# Patient Record
Sex: MALE | NOT HISPANIC OR LATINO | URBAN - METROPOLITAN AREA
[De-identification: names, ages, dates, MRNs, and addresses within clinical notes are randomized per-mention and may not be internally consistent; named-entity substitution may affect disease eponyms.]

---
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
## 2020-11-30 ENCOUNTER — IMPORTED ENCOUNTER (OUTPATIENT)
Dept: URBAN - METROPOLITAN AREA CLINIC 1 | Facility: CLINIC | Age: 20
End: 2020-11-30

## 2020-11-30 PROBLEM — H52.13: Noted: 2020-11-30

## 2020-11-30 PROBLEM — H52.222: Noted: 2020-11-30

## 2020-11-30 PROCEDURE — S0621 ROUTINE OPHTHALMOLOGICAL EXA: HCPCS

## 2020-11-30 NOTE — PATIENT DISCUSSION
1. Myopia w/ Astigmatism OS -- Rx was given for correction if indicated and requested. Return for an appointment in 1 year 36 with Dr. Nona No.

## 2021-10-18 ENCOUNTER — IMPORTED ENCOUNTER (OUTPATIENT)
Dept: URBAN - METROPOLITAN AREA CLINIC 1 | Facility: CLINIC | Age: 21
End: 2021-10-18

## 2021-10-18 PROBLEM — H10.45: Noted: 2021-10-18

## 2021-10-18 PROBLEM — H04.123: Noted: 2021-10-18

## 2021-10-18 PROCEDURE — 99214 OFFICE O/P EST MOD 30 MIN: CPT

## 2021-10-18 NOTE — PATIENT DISCUSSION
1.  Dry Eyes OU -- Recommend ATs BID OU routinely (Sample given of Refresh). 2. Allergic Conjunctivitis OU -- Recommend OTC Pataday QD OU PRN for itching (Handout given). The condition was  discussed with the patient. Avoidance of allergens and cool compresses were recommended. Patient deferred Manifest Rx today. (seen under vision plan)Return for an appointment as scheduled for 40 with Dr. Arsenio Urbina.

## 2022-04-02 ASSESSMENT — VISUAL ACUITY
OD_SC: 20/20
OD_SC: J1
OS_CC: 20/40
OS_CC: J1
OD_SC: 20/20
OS_SC: J1
OD_SC: 20/20
OS_SC: 20/20
OD_CC: J1
OS_SC: 20/20
OD_CC: J1
OD_CC: 20/30
OS_CC: J1
OS_SC: 20/20

## 2022-04-02 ASSESSMENT — TONOMETRY
OS_IOP_MMHG: 12
OS_IOP_MMHG: 12
OD_IOP_MMHG: 13
OD_IOP_MMHG: 14
OD_IOP_MMHG: 14
OD_IOP_MMHG: 13
OS_IOP_MMHG: 14
OS_IOP_MMHG: 13

## 2022-08-03 ENCOUNTER — COMPREHENSIVE EXAM (OUTPATIENT)
Dept: URBAN - METROPOLITAN AREA CLINIC 1 | Facility: CLINIC | Age: 22
End: 2022-08-03

## 2022-08-03 DIAGNOSIS — H52.13: ICD-10-CM

## 2022-08-03 DIAGNOSIS — H52.222: ICD-10-CM

## 2022-08-03 PROCEDURE — 92014 COMPRE OPH EXAM EST PT 1/>: CPT

## 2022-08-03 ASSESSMENT — VISUAL ACUITY
OD_CC: J1+
OS_SC: 20/50+2
OD_CC: 20/20-1
OS_CC: J1+
OD_SC: 20/50
OS_CC: 20/20

## 2022-08-03 ASSESSMENT — TONOMETRY
OD_IOP_MMHG: 13
OS_IOP_MMHG: 13

## 2023-08-04 ENCOUNTER — COMPREHENSIVE EXAM (OUTPATIENT)
Dept: URBAN - METROPOLITAN AREA CLINIC 1 | Facility: CLINIC | Age: 23
End: 2023-08-04

## 2023-08-04 DIAGNOSIS — Z01.00: ICD-10-CM

## 2023-08-04 PROCEDURE — 92015 DETERMINE REFRACTIVE STATE: CPT

## 2023-08-04 PROCEDURE — 92014 COMPRE OPH EXAM EST PT 1/>: CPT

## 2023-08-04 ASSESSMENT — VISUAL ACUITY
OD_SC: J1+
OD_CC: 20/20-1
OS_CC: 20/20-1
OS_SC: J1+

## 2023-08-04 ASSESSMENT — TONOMETRY
OS_IOP_MMHG: 13
OD_IOP_MMHG: 12

## 2024-08-05 ENCOUNTER — COMPREHENSIVE EXAM (OUTPATIENT)
Dept: URBAN - METROPOLITAN AREA CLINIC 1 | Facility: CLINIC | Age: 24
End: 2024-08-05

## 2024-08-05 DIAGNOSIS — Z01.00: ICD-10-CM

## 2024-08-05 DIAGNOSIS — H52.13: ICD-10-CM

## 2024-08-05 DIAGNOSIS — H52.222: ICD-10-CM

## 2024-08-05 PROCEDURE — 92014 COMPRE OPH EXAM EST PT 1/>: CPT

## 2024-08-05 PROCEDURE — 92015 DETERMINE REFRACTIVE STATE: CPT

## 2024-08-05 ASSESSMENT — TONOMETRY
OS_IOP_MMHG: 13
OD_IOP_MMHG: 13

## 2024-08-05 ASSESSMENT — VISUAL ACUITY
OD_CC: 20/20
OS_CC: 20/20
OD_CC: J1+
OS_CC: J1+